# Patient Record
Sex: FEMALE | Race: WHITE | NOT HISPANIC OR LATINO | Employment: OTHER | URBAN - METROPOLITAN AREA
[De-identification: names, ages, dates, MRNs, and addresses within clinical notes are randomized per-mention and may not be internally consistent; named-entity substitution may affect disease eponyms.]

---

## 2017-01-05 RX ORDER — VALSARTAN AND HYDROCHLOROTHIAZIDE 160; 25 MG/1; MG/1
TABLET ORAL
Qty: 30 TABLET | Refills: 2 | Status: SHIPPED | OUTPATIENT
Start: 2017-01-05 | End: 2017-01-05 | Stop reason: SDUPTHER

## 2017-01-05 RX ORDER — VALSARTAN AND HYDROCHLOROTHIAZIDE 160; 25 MG/1; MG/1
1 TABLET ORAL DAILY
Qty: 30 TABLET | Refills: 2 | Status: SHIPPED | OUTPATIENT
Start: 2017-01-05 | End: 2017-03-07 | Stop reason: SDUPTHER

## 2017-01-05 NOTE — TELEPHONE ENCOUNTER
We can send patient in a 3 months supply of blood pressure medication. She does need to get in for an appointment in the next three months for a blood pressure check.

## 2017-02-03 ENCOUNTER — TELEPHONE (OUTPATIENT)
Dept: FAMILY MEDICINE CLINIC | Facility: CLINIC | Age: 62
End: 2017-02-03

## 2017-02-03 NOTE — TELEPHONE ENCOUNTER
We received a refill request for Cantril Thyroid. Patient needs to come in for an appointment. We can send her in 30 day supply if needed.

## 2017-02-10 ENCOUNTER — TELEPHONE (OUTPATIENT)
Dept: FAMILY MEDICINE CLINIC | Facility: CLINIC | Age: 62
End: 2017-02-10

## 2017-02-10 RX ORDER — LEVOTHYROXINE AND LIOTHYRONINE 57; 13.5 UG/1; UG/1
90 TABLET ORAL DAILY
Qty: 30 TABLET | Refills: 0 | Status: SHIPPED | OUTPATIENT
Start: 2017-02-10 | End: 2017-03-07 | Stop reason: SDUPTHER

## 2017-03-07 ENCOUNTER — OFFICE VISIT (OUTPATIENT)
Dept: FAMILY MEDICINE CLINIC | Facility: CLINIC | Age: 62
End: 2017-03-07

## 2017-03-07 VITALS
DIASTOLIC BLOOD PRESSURE: 78 MMHG | HEIGHT: 64 IN | HEART RATE: 78 BPM | OXYGEN SATURATION: 98 % | SYSTOLIC BLOOD PRESSURE: 128 MMHG | BODY MASS INDEX: 37.46 KG/M2 | WEIGHT: 219.4 LBS

## 2017-03-07 DIAGNOSIS — E03.9 ACQUIRED HYPOTHYROIDISM: ICD-10-CM

## 2017-03-07 DIAGNOSIS — Z00.00 PHYSICAL EXAM: Primary | ICD-10-CM

## 2017-03-07 DIAGNOSIS — I10 ESSENTIAL HYPERTENSION: ICD-10-CM

## 2017-03-07 PROCEDURE — 99396 PREV VISIT EST AGE 40-64: CPT | Performed by: NURSE PRACTITIONER

## 2017-03-07 RX ORDER — VALSARTAN AND HYDROCHLOROTHIAZIDE 160; 25 MG/1; MG/1
1 TABLET ORAL DAILY
Qty: 30 TABLET | Refills: 6 | Status: SHIPPED | OUTPATIENT
Start: 2017-03-07 | End: 2018-05-23 | Stop reason: SDUPTHER

## 2017-03-07 RX ORDER — TIMOLOL MALEATE 5 MG/ML
SOLUTION/ DROPS OPHTHALMIC
COMMUNITY
Start: 2017-02-28 | End: 2018-11-28

## 2017-03-07 RX ORDER — LEVOTHYROXINE AND LIOTHYRONINE 57; 13.5 UG/1; UG/1
90 TABLET ORAL DAILY
Qty: 30 TABLET | Refills: 6 | Status: SHIPPED | OUTPATIENT
Start: 2017-03-07 | End: 2017-10-13 | Stop reason: SDUPTHER

## 2017-03-07 NOTE — PROGRESS NOTES
"Subjective   Amairani Little is a 62 y.o. female.   Here to have blood work and to have her medications refilled.  C/O tinnitus and went to audiologist and does better when she watches her sugar.      Chief Complaint   Patient presents with   • Hypothyroidism     States she need blood work to get her thyroid medication.      Social History   Substance Use Topics   • Smoking status: Former Smoker   • Smokeless tobacco: Never Used   • Alcohol use None       History of Present Illness   Review of Systems   All other systems reviewed and are negative.      Objective   Vitals:    03/07/17 0812   BP: 128/78   Pulse: 78   SpO2: 98%   Weight: 219 lb 6.4 oz (99.5 kg)   Height: 63.5\" (161.3 cm)     Body mass index is 38.26 kg/(m^2).    Physical Exam   Constitutional: She appears well-developed and well-nourished. No distress.   HENT:   Head: Normocephalic.   Eyes: EOM are normal. Pupils are equal, round, and reactive to light.   Neck: Neck supple.   Cardiovascular: Normal rate and regular rhythm.    Pulmonary/Chest: Effort normal and breath sounds normal.   Abdominal: Soft.   Musculoskeletal: Normal range of motion.   Neurological: She is alert.   Skin: Skin is warm.   Nursing note and vitals reviewed.      Assessment/Plan   Problem List Items Addressed This Visit        Cardiovascular and Mediastinum    Essential hypertension    Relevant Medications    valsartan-hydrochlorothiazide (DIOVAN-HCT) 160-25 MG per tablet    Other Relevant Orders    Comprehensive Metabolic Panel      Other Visit Diagnoses     Physical exam    -  Primary    Relevant Orders    CBC Auto Differential    Lipid Panel With / Chol / HDL Ratio    Mammo Screening Bilateral With CAD    Acquired hypothyroidism        Relevant Medications    Thyroid (ARMOUR THYROID) 90 MG PO tablet    Other Relevant Orders    Hemoglobin A1c         Will call with lab results  "

## 2017-03-08 DIAGNOSIS — R89.9 ABNORMAL LABORATORY TEST RESULT: Primary | ICD-10-CM

## 2017-03-08 LAB
ALBUMIN SERPL-MCNC: 4.9 G/DL (ref 3.5–5.2)
ALBUMIN/GLOB SERPL: 1.9 G/DL
ALP SERPL-CCNC: 61 U/L (ref 39–117)
ALT SERPL-CCNC: 28 U/L (ref 1–33)
AST SERPL-CCNC: 27 U/L (ref 1–32)
BASOPHILS # BLD AUTO: 0.03 10*3/MM3 (ref 0–0.2)
BASOPHILS NFR BLD AUTO: 0.5 % (ref 0–1.5)
BILIRUB SERPL-MCNC: 0.8 MG/DL (ref 0.1–1.2)
BUN SERPL-MCNC: 15 MG/DL (ref 8–23)
BUN/CREAT SERPL: 16.5 (ref 7–25)
CALCIUM SERPL-MCNC: 10.1 MG/DL (ref 8.6–10.5)
CHLORIDE SERPL-SCNC: 99 MMOL/L (ref 98–107)
CHOLEST SERPL-MCNC: 274 MG/DL (ref 0–200)
CHOLEST/HDLC SERPL: 3.75 {RATIO}
CO2 SERPL-SCNC: 28.4 MMOL/L (ref 22–29)
CREAT SERPL-MCNC: 0.91 MG/DL (ref 0.57–1)
EOSINOPHIL # BLD AUTO: 0.33 10*3/MM3 (ref 0–0.7)
EOSINOPHIL NFR BLD AUTO: 5.4 % (ref 0.3–6.2)
ERYTHROCYTE [DISTWIDTH] IN BLOOD BY AUTOMATED COUNT: 13.9 % (ref 11.7–13)
GLOBULIN SER CALC-MCNC: 2.6 GM/DL
GLUCOSE SERPL-MCNC: 120 MG/DL (ref 65–99)
HBA1C MFR BLD: 5.4 % (ref 4.8–5.6)
HCT VFR BLD AUTO: 44.2 % (ref 35.6–45.5)
HDLC SERPL-MCNC: 73 MG/DL (ref 40–60)
HGB BLD-MCNC: 14.8 G/DL (ref 11.9–15.5)
IMM GRANULOCYTES # BLD: 0 10*3/MM3 (ref 0–0.03)
IMM GRANULOCYTES NFR BLD: 0 % (ref 0–0.5)
LDLC SERPL CALC-MCNC: 174 MG/DL (ref 0–100)
LYMPHOCYTES # BLD AUTO: 2.4 10*3/MM3 (ref 0.9–4.8)
LYMPHOCYTES NFR BLD AUTO: 39.2 % (ref 19.6–45.3)
MCH RBC QN AUTO: 30.5 PG (ref 26.9–32)
MCHC RBC AUTO-ENTMCNC: 33.5 G/DL (ref 32.4–36.3)
MCV RBC AUTO: 90.9 FL (ref 80.5–98.2)
MONOCYTES # BLD AUTO: 0.5 10*3/MM3 (ref 0.2–1.2)
MONOCYTES NFR BLD AUTO: 8.2 % (ref 5–12)
NEUTROPHILS # BLD AUTO: 2.86 10*3/MM3 (ref 1.9–8.1)
NEUTROPHILS NFR BLD AUTO: 46.7 % (ref 42.7–76)
PLATELET # BLD AUTO: 202 10*3/MM3 (ref 140–500)
POTASSIUM SERPL-SCNC: 3.7 MMOL/L (ref 3.5–5.2)
PROT SERPL-MCNC: 7.5 G/DL (ref 6–8.5)
RBC # BLD AUTO: 4.86 10*6/MM3 (ref 3.9–5.2)
SODIUM SERPL-SCNC: 143 MMOL/L (ref 136–145)
TRIGL SERPL-MCNC: 135 MG/DL (ref 0–150)
VLDLC SERPL CALC-MCNC: 27 MG/DL (ref 5–40)
WBC # BLD AUTO: 6.12 10*3/MM3 (ref 4.5–10.7)

## 2017-03-09 ENCOUNTER — TELEPHONE (OUTPATIENT)
Dept: FAMILY MEDICINE CLINIC | Facility: CLINIC | Age: 62
End: 2017-03-09

## 2017-03-09 DIAGNOSIS — Z13.29 SCREENING FOR HYPOTHYROIDISM: Primary | ICD-10-CM

## 2017-03-09 LAB
Lab: NORMAL
SPECIMEN STATUS: NORMAL
TSH SERPL DL<=0.005 MIU/L-ACNC: 3.35 MIU/ML (ref 0.27–4.2)

## 2017-03-09 NOTE — TELEPHONE ENCOUNTER
----- Message from Kindra Lan sent at 3/8/2017  4:28 PM EST -----  Patient called and I read her the labs results, pt is asking if we done a thryoid and if not can one be added to yesterday labs. Spoke with Landy in [facility] and she said that it could be added. Added and pended order to Kindra Biggs.

## 2017-03-09 NOTE — TELEPHONE ENCOUNTER
----- Message from Nancy Reynoso MA sent at 3/9/2017 11:07 AM EST -----  Regarding: FW: Non-Urgent Medical Question  Contact: [phone]      ----- Message -----     From: Mey Briceno MA     Sent: 3/9/2017  10:48 AM       To: Nancy Reynoso MA  Subject: FW: Non-Urgent Medical Question                      ----- Message -----     From: Amairani Little     Sent: 3/9/2017   9:45 AM       To: Angelika [facility]  Subject: Non-Urgent Medical Question                      The sole reason for my visit Tuesday morning was to get my thyroid tested so that I can get my prescription refilled.  However, no results showing tsh testing was ever done.  Can you use the blood that was drawn for this?    Amairani Little

## 2017-03-16 ENCOUNTER — TELEPHONE (OUTPATIENT)
Dept: FAMILY MEDICINE CLINIC | Facility: CLINIC | Age: 62
End: 2017-03-16

## 2017-03-16 NOTE — TELEPHONE ENCOUNTER
Pt called and left a VM wanting TSH result. Called pt back and gave her results and made her aware that I had sent her a letter with the results as well.

## 2017-04-08 ENCOUNTER — RESULTS ENCOUNTER (OUTPATIENT)
Dept: FAMILY MEDICINE CLINIC | Facility: CLINIC | Age: 62
End: 2017-04-08

## 2017-04-08 DIAGNOSIS — Z13.29 SCREENING FOR HYPOTHYROIDISM: ICD-10-CM

## 2017-07-18 ENCOUNTER — RESULTS ENCOUNTER (OUTPATIENT)
Dept: FAMILY MEDICINE CLINIC | Facility: CLINIC | Age: 62
End: 2017-07-18

## 2017-07-18 DIAGNOSIS — R89.9 ABNORMAL LABORATORY TEST RESULT: ICD-10-CM

## 2017-10-13 RX ORDER — THYROID,PORK 90 MG
TABLET ORAL
Qty: 30 TABLET | Refills: 5 | Status: SHIPPED | OUTPATIENT
Start: 2017-10-13 | End: 2018-05-09

## 2017-11-20 ENCOUNTER — TELEPHONE (OUTPATIENT)
Dept: FAMILY MEDICINE CLINIC | Facility: CLINIC | Age: 62
End: 2017-11-20

## 2017-11-20 NOTE — TELEPHONE ENCOUNTER
Please contact this patient for fasting labs. Her cholesterol was elevated in March, Kindra would like to recheck this number. Let me know what she decides.

## 2018-01-15 RX ORDER — VALSARTAN AND HYDROCHLOROTHIAZIDE 160; 25 MG/1; MG/1
TABLET ORAL
Qty: 30 TABLET | Refills: 1 | OUTPATIENT
Start: 2018-01-15

## 2018-01-25 RX ORDER — VALSARTAN AND HYDROCHLOROTHIAZIDE 160; 25 MG/1; MG/1
TABLET ORAL
Qty: 30 TABLET | Refills: 0 | Status: SHIPPED | OUTPATIENT
Start: 2018-01-25 | End: 2018-02-25 | Stop reason: SDUPTHER

## 2018-02-26 RX ORDER — VALSARTAN AND HYDROCHLOROTHIAZIDE 160; 25 MG/1; MG/1
TABLET ORAL
Qty: 30 TABLET | Refills: 0 | Status: SHIPPED | OUTPATIENT
Start: 2018-02-26 | End: 2018-05-09

## 2018-03-02 RX ORDER — VALSARTAN AND HYDROCHLOROTHIAZIDE 160; 25 MG/1; MG/1
TABLET ORAL
Qty: 30 TABLET | Refills: 0 | Status: SHIPPED | OUTPATIENT
Start: 2018-03-02 | End: 2018-04-30 | Stop reason: SDUPTHER

## 2018-04-30 RX ORDER — VALSARTAN AND HYDROCHLOROTHIAZIDE 160; 25 MG/1; MG/1
TABLET ORAL
Qty: 15 TABLET | Refills: 0 | Status: SHIPPED | OUTPATIENT
Start: 2018-04-30 | End: 2018-05-09

## 2018-04-30 RX ORDER — VALSARTAN AND HYDROCHLOROTHIAZIDE 160; 25 MG/1; MG/1
TABLET ORAL
Qty: 15 TABLET | Refills: 0 | OUTPATIENT
Start: 2018-04-30

## 2018-04-30 NOTE — TELEPHONE ENCOUNTER
Please, call pt and schedule an apt for her Physical wellness or for her BP check, whatever is more convenient for pt. Giving her 15 days supply for her Valsartan-HCTZ. Thanks

## 2018-05-09 ENCOUNTER — OFFICE VISIT (OUTPATIENT)
Dept: FAMILY MEDICINE CLINIC | Facility: CLINIC | Age: 63
End: 2018-05-09

## 2018-05-09 VITALS
OXYGEN SATURATION: 97 % | WEIGHT: 220 LBS | HEIGHT: 64 IN | BODY MASS INDEX: 37.56 KG/M2 | SYSTOLIC BLOOD PRESSURE: 104 MMHG | HEART RATE: 65 BPM | DIASTOLIC BLOOD PRESSURE: 70 MMHG

## 2018-05-09 DIAGNOSIS — R07.9 CHEST PAIN, UNSPECIFIED TYPE: Primary | ICD-10-CM

## 2018-05-09 DIAGNOSIS — E03.8 OTHER SPECIFIED HYPOTHYROIDISM: ICD-10-CM

## 2018-05-09 PROCEDURE — 93000 ELECTROCARDIOGRAM COMPLETE: CPT | Performed by: NURSE PRACTITIONER

## 2018-05-09 PROCEDURE — 99213 OFFICE O/P EST LOW 20 MIN: CPT | Performed by: NURSE PRACTITIONER

## 2018-05-09 NOTE — PROGRESS NOTES
"Amairani Little is a 63 y.o. female.Patient presents for a blood pressure check.     Patient states that she did stop taking Amour thyroid due to side effects, states she feels fine.    Has had some chest pain for over a year, comes and goes, describes it as a pressure type pain, no shortness of breath.      Assessment/Plan   Problem List Items Addressed This Visit        Endocrine    Other specified hypothyroidism    Relevant Orders    Ambulatory Referral to ENT (Otolaryngology)      Other Visit Diagnoses     Chest pain, unspecified type    -  Primary    Relevant Orders    ECG 12 Lead             No Follow-up on file.  There are no Patient Instructions on file for this visit.    Chief Complaint   Patient presents with   • Hypertension     Social History   Substance Use Topics   • Smoking status: Former Smoker   • Smokeless tobacco: Never Used   • Alcohol use Not on file       History of Present Illness     The following portions of the patient's history were reviewed and updated as appropriate:PMHroutine: Social history , Allergies, Current Medications and Active Problem List    Review of Systems   Constitutional: Negative for activity change, appetite change and fatigue.   Respiratory: Positive for chest tightness. Negative for cough and shortness of breath.    Cardiovascular: Negative for chest pain, palpitations and leg swelling.   Endocrine: Negative for cold intolerance and heat intolerance.   Neurological: Negative for dizziness.       Objective   Vitals:    05/09/18 1409   BP: 104/70   Pulse: 65   SpO2: 97%   Weight: 99.8 kg (220 lb)   Height: 161.3 cm (63.5\")     Body mass index is 38.36 kg/m².  Physical Exam   Constitutional: She appears well-developed and well-nourished. No distress.   HENT:   Head: Normocephalic and atraumatic.   Right Ear: External ear normal.   Left Ear: External ear normal.   Eyes: EOM are normal.   Neck: Neck supple. No thyromegaly present.   Cardiovascular: Normal rate, regular rhythm " and normal heart sounds.    Pulmonary/Chest: Effort normal and breath sounds normal.   Musculoskeletal: Normal range of motion.   Neurological: She is alert.   Skin: Skin is warm.   Nursing note and vitals reviewed.    Reviewed Data:  No visits with results within 1 Month(s) from this visit.   Latest known visit with results is:   Office Visit on 03/07/2017   Component Date Value Ref Range Status   • Glucose 03/08/2017 120* 65 - 99 mg/dL Final   • BUN 03/08/2017 15  8 - 23 mg/dL Final   • Creatinine 03/08/2017 0.91  0.57 - 1.00 mg/dL Final   • eGFR Non  Am 03/08/2017 62  >60 mL/min/1.73 Final   • eGFR African Am 03/08/2017 75  >60 mL/min/1.73 Final   • BUN/Creatinine Ratio 03/08/2017 16.5  7.0 - 25.0 Final   • Sodium 03/08/2017 143  136 - 145 mmol/L Final   • Potassium 03/08/2017 3.7  3.5 - 5.2 mmol/L Final   • Chloride 03/08/2017 99  98 - 107 mmol/L Final   • Total CO2 03/08/2017 28.4  22.0 - 29.0 mmol/L Final   • Calcium 03/08/2017 10.1  8.6 - 10.5 mg/dL Final   • Total Protein 03/08/2017 7.5  6.0 - 8.5 g/dL Final   • Albumin 03/08/2017 4.90  3.50 - 5.20 g/dL Final   • Globulin 03/08/2017 2.6  gm/dL Final   • A/G Ratio 03/08/2017 1.9  g/dL Final   • Total Bilirubin 03/08/2017 0.8  0.1 - 1.2 mg/dL Final   • Alkaline Phosphatase 03/08/2017 61  39 - 117 U/L Final   • AST (SGOT) 03/08/2017 27  1 - 32 U/L Final   • ALT (SGPT) 03/08/2017 28  1 - 33 U/L Final   • Total Cholesterol 03/08/2017 274* 0 - 200 mg/dL Final   • Triglycerides 03/08/2017 135  0 - 150 mg/dL Final   • HDL Cholesterol 03/08/2017 73* 40 - 60 mg/dL Final   • VLDL Cholesterol 03/08/2017 27  5 - 40 mg/dL Final   • LDL Cholesterol  03/08/2017 174* 0 - 100 mg/dL Final   • Chol/HDL Ratio 03/08/2017 3.75   Final   • Hemoglobin A1C 03/08/2017 5.40  4.80 - 5.60 % Final    Comment: Hemoglobin A1C Ranges:  Increased Risk for Diabetes  5.7% to 6.4%  Diabetes                     >= 6.5%  Diabetic Goal                < 7.0%     • WBC 03/08/2017 6.12  4.50 -  10.70 10*3/mm3 Final   • RBC 03/08/2017 4.86  3.90 - 5.20 10*6/mm3 Final   • Hemoglobin 03/08/2017 14.8  11.9 - 15.5 g/dL Final   • Hematocrit 03/08/2017 44.2  35.6 - 45.5 % Final   • MCV 03/08/2017 90.9  80.5 - 98.2 fL Final   • MCH 03/08/2017 30.5  26.9 - 32.0 pg Final   • MCHC 03/08/2017 33.5  32.4 - 36.3 g/dL Final   • RDW 03/08/2017 13.9* 11.7 - 13.0 % Final   • Platelets 03/08/2017 202  140 - 500 10*3/mm3 Final   • Neutrophil Rel % 03/08/2017 46.7  42.7 - 76.0 % Final   • Lymphocyte Rel % 03/08/2017 39.2  19.6 - 45.3 % Final   • Monocyte Rel % 03/08/2017 8.2  5.0 - 12.0 % Final   • Eosinophil Rel % 03/08/2017 5.4  0.3 - 6.2 % Final   • Basophil Rel % 03/08/2017 0.5  0.0 - 1.5 % Final   • Neutrophils Absolute 03/08/2017 2.86  1.90 - 8.10 10*3/mm3 Final   • Lymphocytes Absolute 03/08/2017 2.40  0.90 - 4.80 10*3/mm3 Final   • Monocytes Absolute 03/08/2017 0.50  0.20 - 1.20 10*3/mm3 Final   • Eosinophils Absolute 03/08/2017 0.33  0.00 - 0.70 10*3/mm3 Final   • Basophils Absolute 03/08/2017 0.03  0.00 - 0.20 10*3/mm3 Final   • Immature Granulocyte Rel % 03/08/2017 0.0  0.0 - 0.5 % Final   • Immature Grans Absolute 03/08/2017 0.00  0.00 - 0.03 10*3/mm3 Final   • Please note 03/09/2017 Comment   Final    Comment: We have received your request for additional testing or test  verification.  You will be notified if we are unable to process  your request.     • Specimen Status 03/09/2017 Comment   Final    Comment: Written Authorization  Written Authorization  Written Authorization Received.  Authorization received from WRITTEN REQUEST 03-  Logged by Angela Quinn     • TSH 03/09/2017 3.350  0.270 - 4.200 mIU/mL Final       ECG 12 Lead  Date/Time: 5/10/2018 1:38 PM  Performed by: SUMAN BEE  Authorized by: SUMAN BEE   Comparison: not compared with previous ECG   Rhythm: sinus rhythm  Rate: normal  ST Segments: ST segments normal  QRS axis: normal  Comments: Normal ECG will return and follow up with  Dr. Weinberg if the pain continues

## 2018-05-15 RX ORDER — VALSARTAN AND HYDROCHLOROTHIAZIDE 160; 25 MG/1; MG/1
TABLET ORAL
Qty: 15 TABLET | Refills: 0 | OUTPATIENT
Start: 2018-05-15

## 2018-05-23 RX ORDER — VALSARTAN AND HYDROCHLOROTHIAZIDE 160; 25 MG/1; MG/1
TABLET ORAL
Qty: 30 TABLET | Refills: 1 | Status: SHIPPED | OUTPATIENT
Start: 2018-05-23 | End: 2018-07-25 | Stop reason: ALTCHOICE

## 2018-06-05 ENCOUNTER — TELEPHONE (OUTPATIENT)
Dept: FAMILY MEDICINE CLINIC | Facility: CLINIC | Age: 63
End: 2018-06-05

## 2018-06-14 ENCOUNTER — TELEPHONE (OUTPATIENT)
Dept: FAMILY MEDICINE CLINIC | Facility: CLINIC | Age: 63
End: 2018-06-14

## 2018-07-25 DIAGNOSIS — I10 ESSENTIAL HYPERTENSION: Primary | ICD-10-CM

## 2018-07-25 RX ORDER — VALSARTAN AND HYDROCHLOROTHIAZIDE 160; 25 MG/1; MG/1
TABLET ORAL
Qty: 30 TABLET | Refills: 0 | OUTPATIENT
Start: 2018-07-25

## 2018-07-25 RX ORDER — IRBESARTAN AND HYDROCHLOROTHIAZIDE 150; 12.5 MG/1; MG/1
1 TABLET, FILM COATED ORAL DAILY
Qty: 30 TABLET | Refills: 1 | Status: SHIPPED | OUTPATIENT
Start: 2018-07-25 | End: 2018-10-09 | Stop reason: SDUPTHER

## 2018-07-25 NOTE — TELEPHONE ENCOUNTER
Sidra this Valsartan has been taken off the market. Please make a change and send back to me so that I can inform the patient of the change. I will ask her to keep a log for the next few weeks and send us these results. Last OV ace/ Kindra Biggs on 5/9/18.

## 2018-07-25 NOTE — TELEPHONE ENCOUNTER
Patient made aware and voiced understanding. She will log her BP and send a [employer] message with results.    Patient states that she did see endocrinology for TSH levels, she was placed on levothyroxine and had a bad reaction. She stopped taking the medication and has decided to handle this with supplements.

## 2018-09-17 DIAGNOSIS — Z12.39 SCREENING FOR BREAST CANCER: Primary | ICD-10-CM

## 2018-10-09 DIAGNOSIS — I10 ESSENTIAL HYPERTENSION: ICD-10-CM

## 2018-10-09 RX ORDER — IRBESARTAN AND HYDROCHLOROTHIAZIDE 150; 12.5 MG/1; MG/1
TABLET, FILM COATED ORAL
Qty: 30 TABLET | Refills: 0 | Status: SHIPPED | OUTPATIENT
Start: 2018-10-09 | End: 2018-11-14 | Stop reason: SDUPTHER

## 2018-11-14 DIAGNOSIS — I10 ESSENTIAL HYPERTENSION: ICD-10-CM

## 2018-11-15 RX ORDER — IRBESARTAN AND HYDROCHLOROTHIAZIDE 150; 12.5 MG/1; MG/1
TABLET, FILM COATED ORAL
Qty: 30 TABLET | Refills: 0 | Status: SHIPPED | OUTPATIENT
Start: 2018-11-15 | End: 2018-12-15 | Stop reason: SDUPTHER

## 2018-11-28 ENCOUNTER — OFFICE VISIT (OUTPATIENT)
Dept: FAMILY MEDICINE CLINIC | Facility: CLINIC | Age: 63
End: 2018-11-28

## 2018-11-28 VITALS
HEART RATE: 72 BPM | SYSTOLIC BLOOD PRESSURE: 142 MMHG | HEIGHT: 64 IN | WEIGHT: 223 LBS | BODY MASS INDEX: 38.07 KG/M2 | DIASTOLIC BLOOD PRESSURE: 78 MMHG | OXYGEN SATURATION: 97 %

## 2018-11-28 DIAGNOSIS — R07.89 CHEST PRESSURE: Primary | ICD-10-CM

## 2018-11-28 PROCEDURE — 99213 OFFICE O/P EST LOW 20 MIN: CPT | Performed by: NURSE PRACTITIONER

## 2018-11-28 PROCEDURE — 93000 ELECTROCARDIOGRAM COMPLETE: CPT | Performed by: NURSE PRACTITIONER

## 2018-11-28 RX ORDER — TIMOLOL MALEATE 5 MG/ML
1 SOLUTION/ DROPS OPHTHALMIC 2 TIMES DAILY
COMMUNITY

## 2018-11-28 NOTE — PROGRESS NOTES
"Amairani Little is a 63 y.o. female.Amairani states that she was seen by a thyroid specialist, she was placed on medication, but did not like it. She continues to have intermittent chest pain/pressure with exertion, has been for about 8 months.       Assessment/Plan   Problem List Items Addressed This Visit     None      Visit Diagnoses     Chest pressure    -  Primary    Relevant Orders    ECG 12 Lead    Ambulatory Referral to Cardiology             No Follow-up on file.  There are no Patient Instructions on file for this visit.    Chief Complaint   Patient presents with   • Hypertension     Social History     Tobacco Use   • Smoking status: Former Smoker   • Smokeless tobacco: Never Used   Substance Use Topics   • Alcohol use: Not on file   • Drug use: No       History of Present Illness     The following portions of the patient's history were reviewed and updated as appropriate:PMHroutine: Social history , Allergies, Current Medications, Active Problem List, Family History and Health Maintenance    Review of Systems   Constitutional: Negative for activity change and appetite change.   Respiratory: Negative for cough.    Cardiovascular: Positive for chest pain. Negative for palpitations and leg swelling.   Gastrointestinal: Negative for nausea and vomiting.   Neurological: Negative for dizziness and headaches.       Objective   Vitals:    11/28/18 1311   BP: 142/78   Pulse: 72   SpO2: 97%   Weight: 101 kg (223 lb)   Height: 161.3 cm (63.5\")     Body mass index is 38.88 kg/m².  Physical Exam   Constitutional: She appears well-developed and well-nourished. No distress.   HENT:   Head: Normocephalic and atraumatic.   Eyes: EOM are normal.   Neck: Neck supple. No thyromegaly present.   Cardiovascular: Normal rate, regular rhythm and normal heart sounds.   Pulmonary/Chest: Effort normal and breath sounds normal.   Musculoskeletal: Normal range of motion.   Neurological: She is alert.   Skin: Skin is warm.   Nursing note " and vitals reviewed.      Reviewed Data:  No visits with results within 1 Month(s) from this visit.   Latest known visit with results is:   Office Visit on 03/07/2017   Component Date Value Ref Range Status   • Glucose 03/07/2017 120* 65 - 99 mg/dL Final   • BUN 03/07/2017 15  8 - 23 mg/dL Final   • Creatinine 03/07/2017 0.91  0.57 - 1.00 mg/dL Final   • eGFR Non  Am 03/07/2017 62  >60 mL/min/1.73 Final   • eGFR African Am 03/07/2017 75  >60 mL/min/1.73 Final   • BUN/Creatinine Ratio 03/07/2017 16.5  7.0 - 25.0 Final   • Sodium 03/07/2017 143  136 - 145 mmol/L Final   • Potassium 03/07/2017 3.7  3.5 - 5.2 mmol/L Final   • Chloride 03/07/2017 99  98 - 107 mmol/L Final   • Total CO2 03/07/2017 28.4  22.0 - 29.0 mmol/L Final   • Calcium 03/07/2017 10.1  8.6 - 10.5 mg/dL Final   • Total Protein 03/07/2017 7.5  6.0 - 8.5 g/dL Final   • Albumin 03/07/2017 4.90  3.50 - 5.20 g/dL Final   • Globulin 03/07/2017 2.6  gm/dL Final   • A/G Ratio 03/07/2017 1.9  g/dL Final   • Total Bilirubin 03/07/2017 0.8  0.1 - 1.2 mg/dL Final   • Alkaline Phosphatase 03/07/2017 61  39 - 117 U/L Final   • AST (SGOT) 03/07/2017 27  1 - 32 U/L Final   • ALT (SGPT) 03/07/2017 28  1 - 33 U/L Final   • Total Cholesterol 03/07/2017 274* 0 - 200 mg/dL Final   • Triglycerides 03/07/2017 135  0 - 150 mg/dL Final   • HDL Cholesterol 03/07/2017 73* 40 - 60 mg/dL Final   • VLDL Cholesterol 03/07/2017 27  5 - 40 mg/dL Final   • LDL Cholesterol  03/07/2017 174* 0 - 100 mg/dL Final   • Chol/HDL Ratio 03/07/2017 3.75   Final   • Hemoglobin A1C 03/07/2017 5.40  4.80 - 5.60 % Final    Comment: Hemoglobin A1C Ranges:  Increased Risk for Diabetes  5.7% to 6.4%  Diabetes                     >= 6.5%  Diabetic Goal                < 7.0%     • WBC 03/07/2017 6.12  4.50 - 10.70 10*3/mm3 Final   • RBC 03/07/2017 4.86  3.90 - 5.20 10*6/mm3 Final   • Hemoglobin 03/07/2017 14.8  11.9 - 15.5 g/dL Final   • Hematocrit 03/07/2017 44.2  35.6 - 45.5 % Final   • MCV  03/07/2017 90.9  80.5 - 98.2 fL Final   • MCH 03/07/2017 30.5  26.9 - 32.0 pg Final   • MCHC 03/07/2017 33.5  32.4 - 36.3 g/dL Final   • RDW 03/07/2017 13.9* 11.7 - 13.0 % Final   • Platelets 03/07/2017 202  140 - 500 10*3/mm3 Final   • Neutrophil Rel % 03/07/2017 46.7  42.7 - 76.0 % Final   • Lymphocyte Rel % 03/07/2017 39.2  19.6 - 45.3 % Final   • Monocyte Rel % 03/07/2017 8.2  5.0 - 12.0 % Final   • Eosinophil Rel % 03/07/2017 5.4  0.3 - 6.2 % Final   • Basophil Rel % 03/07/2017 0.5  0.0 - 1.5 % Final   • Neutrophils Absolute 03/07/2017 2.86  1.90 - 8.10 10*3/mm3 Final   • Lymphocytes Absolute 03/07/2017 2.40  0.90 - 4.80 10*3/mm3 Final   • Monocytes Absolute 03/07/2017 0.50  0.20 - 1.20 10*3/mm3 Final   • Eosinophils Absolute 03/07/2017 0.33  0.00 - 0.70 10*3/mm3 Final   • Basophils Absolute 03/07/2017 0.03  0.00 - 0.20 10*3/mm3 Final   • Immature Granulocyte Rel % 03/07/2017 0.0  0.0 - 0.5 % Final   • Immature Grans Absolute 03/07/2017 0.00  0.00 - 0.03 10*3/mm3 Final   • Please note 03/07/2017 Comment   Final    Comment: We have received your request for additional testing or test  verification.  You will be notified if we are unable to process  your request.     • Specimen Status 03/07/2017 Comment   Final    Comment: Written Authorization  Written Authorization  Written Authorization Received.  Authorization received from WRITTEN REQUEST 03-  Logged by Angela Quinn     • TSH 03/07/2017 3.350  0.270 - 4.200 mIU/mL Final       ECG 12 Lead  Date/Time: 11/29/2018 9:02 AM  Performed by: Kindra Biggs APRN  Authorized by: Warren, Kindra M, APRN   Comparison: not compared with previous ECG   Rhythm: sinus rhythm  Rate: normal  ST Segments: ST segments normal  T Waves: T waves normal  QRS axis: normal  Clinical impression: normal ECG  Comments: Pt would like to be seen by a cardiologist and since this has been going on for so long a referral was placed

## 2018-12-15 DIAGNOSIS — I10 ESSENTIAL HYPERTENSION: ICD-10-CM

## 2018-12-17 RX ORDER — IRBESARTAN AND HYDROCHLOROTHIAZIDE 150; 12.5 MG/1; MG/1
TABLET, FILM COATED ORAL
Qty: 30 TABLET | Refills: 0 | Status: SHIPPED | OUTPATIENT
Start: 2018-12-17 | End: 2019-01-15 | Stop reason: SDUPTHER

## 2018-12-20 ENCOUNTER — OFFICE VISIT (OUTPATIENT)
Dept: CARDIOLOGY | Facility: CLINIC | Age: 63
End: 2018-12-20

## 2018-12-20 VITALS
SYSTOLIC BLOOD PRESSURE: 124 MMHG | DIASTOLIC BLOOD PRESSURE: 88 MMHG | HEART RATE: 66 BPM | HEIGHT: 64 IN | WEIGHT: 222 LBS | BODY MASS INDEX: 37.9 KG/M2

## 2018-12-20 DIAGNOSIS — E66.9 OBESITY (BMI 35.0-39.9 WITHOUT COMORBIDITY): ICD-10-CM

## 2018-12-20 DIAGNOSIS — R07.2 PRECORDIAL PAIN: Primary | ICD-10-CM

## 2018-12-20 DIAGNOSIS — I10 ESSENTIAL HYPERTENSION: ICD-10-CM

## 2018-12-20 PROCEDURE — 93000 ELECTROCARDIOGRAM COMPLETE: CPT | Performed by: INTERNAL MEDICINE

## 2018-12-20 PROCEDURE — 99204 OFFICE O/P NEW MOD 45 MIN: CPT | Performed by: INTERNAL MEDICINE

## 2018-12-20 NOTE — PROGRESS NOTES
Date of Office Visit: 2018  Encounter Provider: Mateo Asif MD  Place of Service: [facility] CARDIOLOGY  Patient Name: Amairani Little  :1955  Kindra Biggs APRN    Chief Complaint   Patient presents with   • Chest Pain     History of Present Illness  I appreciate the opportunity to evaluate this patient in consultation for chest pain.    The patient noted the onset of substernal chest discomfort in 2017.  She describes a heaviness across her precordium that radiates up into the neck.  The episodes are precipitated by exertion or stressful situations.  They last for minutes up to one hour.  They are always relieved by rest.  She has had some nocturnal episodes that have awakened her from sleep.  The intensity of the discomfort has not changed but the frequency has increased over the past year.  There is some radiation of the symptoms to her left and right arms.  There is no radiation up into the jaw.  There is no associated diaphoresis or palpitations.    The patient's risk factors for coronary disease include hypertension, hyperlipidemia and a family history.  The patient also has a remote smoking history but none since .  She also has been moderately obese most of her adult life.      Past Medical History:   Diagnosis Date   • Chest pressure    • Depression    • Essential hypertension    • Glaucoma    • Hyperlipidemia    • Hypothyroidism    • Interstitial cystitis          Past Surgical History:   Procedure Laterality Date   • CHOLECYSTECTOMY     • ENDOMETRIAL ABLATION     • EYE ENUCLEATION Left age 11   • TONSILLECTOMY             Current Outpatient Medications:   •  irbesartan-hydrochlorothiazide (AVALIDE) 150-12.5 MG tablet, TAKE ONE TABLET BY MOUTH DAILY, Disp: 30 tablet, Rfl: 0  •  timolol (TIMOPTIC) 0.5 % ophthalmic solution, 1 drop 2 (Two) Times a Day., Disp: , Rfl:       Social History     Socioeconomic History   • Marital status: Single      "Spouse name: Not on file   • Number of children: Not on file   • Years of education: Not on file   • Highest education level: Not on file   Social Needs   • Financial resource strain: Not on file   • Food insecurity - worry: Not on file   • Food insecurity - inability: Not on file   • Transportation needs - medical: Not on file   • Transportation needs - non-medical: Not on file   Occupational History   • Not on file   Tobacco Use   • Smoking status: Former Smoker   • Smokeless tobacco: Never Used   Substance and Sexual Activity   • Alcohol use: Not on file   • Drug use: No   • Sexual activity: Defer   Other Topics Concern   • Not on file   Social History Narrative   • Not on file         Review of Systems   Constitution: Positive for malaise/fatigue.   HENT: Negative.    Eyes: Negative.    Cardiovascular: Positive for chest pain, dyspnea on exertion and palpitations.   Respiratory: Negative.    Endocrine: Negative.    Skin: Negative.    Musculoskeletal: Negative.    Gastrointestinal: Negative.    Neurological: Negative.    Psychiatric/Behavioral: Negative.        Procedures      ECG 12 Lead  Date/Time: 12/20/2018 3:39 PM  Performed by: Mateo Asif MD  Authorized by: Mateo Asif MD   Comparison: compared with previous ECG from 11/28/2018  Similar to previous ECG  Rhythm: sinus rhythm  Rate: normal  Conduction: conduction normal  QRS axis: normal                  Objective:    /88   Pulse 66   Ht 161.3 cm (63.5\")   Wt 101 kg (222 lb)   BMI 38.71 kg/m²         Physical Exam   Constitutional: She is oriented to person, place, and time. She appears well-developed and well-nourished.   HENT:   Head: Normocephalic.   Eyes: Pupils are equal, round, and reactive to light.   Neck: Normal range of motion. No JVD present. Carotid bruit is not present. No thyromegaly present.   Cardiovascular: Normal rate, regular rhythm, S1 normal, S2 normal, normal heart sounds and intact distal pulses. Exam " reveals no gallop and no friction rub.   No murmur heard.  Pulmonary/Chest: Effort normal and breath sounds normal.   Abdominal: Soft. Bowel sounds are normal.   Musculoskeletal: She exhibits no edema.   Neurological: She is alert and oriented to person, place, and time.   Skin: Skin is warm, dry and intact. No erythema.   Psychiatric: She has a normal mood and affect.   Vitals reviewed.          Assessment:       Diagnosis Plan   1. Precordial pain  Treadmill Stress Test   2. Essential hypertension  Treadmill Stress Test   3. Obesity (BMI 35.0-39.9 without comorbidity)       1.  Chest pain: The patient's symptoms appeared to be that of angina pectoris.  Her electrocardiogram and exam however remain normal.  We will plan on a stress test.    2.  Hypertension: Controlled on medical therapy    3.  Obesity: Diet and exercise measures discussed       Plan:       Review stress test when complete.    I appreciate the opportunity to evaluate this patient in consultation

## 2018-12-28 ENCOUNTER — HOSPITAL ENCOUNTER (OUTPATIENT)
Dept: CARDIOLOGY | Facility: HOSPITAL | Age: 63
Discharge: HOME | End: 2018-12-28
Attending: INTERNAL MEDICINE | Admitting: INTERNAL MEDICINE

## 2018-12-28 DIAGNOSIS — I10 ESSENTIAL HYPERTENSION: ICD-10-CM

## 2018-12-28 DIAGNOSIS — R07.2 PRECORDIAL PAIN: ICD-10-CM

## 2018-12-28 LAB
BH CV STRESS BP STAGE 1: NORMAL
BH CV STRESS BP STAGE 2: NORMAL
BH CV STRESS DURATION MIN STAGE 1: 3
BH CV STRESS DURATION MIN STAGE 2: 3
BH CV STRESS DURATION SEC STAGE 1: 0
BH CV STRESS DURATION SEC STAGE 2: 0
BH CV STRESS DURATION SEC STAGE 3: 30
BH CV STRESS GRADE STAGE 1: 10
BH CV STRESS GRADE STAGE 2: 12
BH CV STRESS GRADE STAGE 3: 14
BH CV STRESS HR STAGE 1: 115
BH CV STRESS HR STAGE 2: 146
BH CV STRESS HR STAGE 3: 156
BH CV STRESS METS STAGE 1: 5
BH CV STRESS METS STAGE 2: 7.5
BH CV STRESS METS STAGE 3: 10
BH CV STRESS PROTOCOL 1: NORMAL
BH CV STRESS RECOVERY BP: NORMAL MMHG
BH CV STRESS RECOVERY HR: 103 BPM
BH CV STRESS SPEED STAGE 1: 1.7
BH CV STRESS SPEED STAGE 2: 2.5
BH CV STRESS SPEED STAGE 3: 3.4
BH CV STRESS STAGE 1: 1
BH CV STRESS STAGE 2: 2
BH CV STRESS STAGE 3: 3
MAXIMAL PREDICTED HEART RATE: 155 BPM
PERCENT MAX PREDICTED HR: 100.65 %
STRESS BASELINE BP: NORMAL MMHG
STRESS BASELINE HR: 77 BPM
STRESS PERCENT HR: 118 %
STRESS POST ESTIMATED WORKLOAD: 7 METS
STRESS POST EXERCISE DUR MIN: 6 MIN
STRESS POST EXERCISE DUR SEC: 30 SEC
STRESS POST PEAK BP: NORMAL MMHG
STRESS POST PEAK HR: 156 BPM
STRESS TARGET HR: 132 BPM

## 2018-12-28 PROCEDURE — 93018 CV STRESS TEST I&R ONLY: CPT | Performed by: INTERNAL MEDICINE

## 2018-12-28 PROCEDURE — 93016 CV STRESS TEST SUPVJ ONLY: CPT | Performed by: INTERNAL MEDICINE

## 2018-12-28 PROCEDURE — 93017 CV STRESS TEST TRACING ONLY: CPT

## 2018-12-31 DIAGNOSIS — I20.9 ANGINA PECTORIS (HCC): Primary | ICD-10-CM

## 2019-01-03 ENCOUNTER — TRANSCRIBE ORDERS (OUTPATIENT)
Dept: CARDIOLOGY | Facility: CLINIC | Age: 64
End: 2019-01-03

## 2019-01-03 DIAGNOSIS — Z13.6 SCREENING FOR CARDIOVASCULAR CONDITION: Primary | ICD-10-CM

## 2019-01-03 DIAGNOSIS — Z01.810 PREPROCEDURAL CARDIOVASCULAR EXAMINATION: ICD-10-CM

## 2019-01-03 DIAGNOSIS — I20.9 ANGINA PECTORIS (HCC): ICD-10-CM

## 2019-01-15 DIAGNOSIS — I10 ESSENTIAL HYPERTENSION: ICD-10-CM

## 2019-01-15 RX ORDER — IRBESARTAN AND HYDROCHLOROTHIAZIDE 150; 12.5 MG/1; MG/1
TABLET, FILM COATED ORAL
Qty: 30 TABLET | Refills: 2 | Status: SHIPPED | OUTPATIENT
Start: 2019-01-15 | End: 2019-04-15 | Stop reason: SDUPTHER

## 2019-02-05 ENCOUNTER — LAB (OUTPATIENT)
Dept: LAB | Facility: HOSPITAL | Age: 64
End: 2019-02-05

## 2019-02-05 DIAGNOSIS — I20.9 ANGINA PECTORIS (HCC): ICD-10-CM

## 2019-02-05 DIAGNOSIS — Z01.810 PREPROCEDURAL CARDIOVASCULAR EXAMINATION: ICD-10-CM

## 2019-02-05 DIAGNOSIS — Z13.6 SCREENING FOR CARDIOVASCULAR CONDITION: ICD-10-CM

## 2019-02-05 LAB
ANION GAP SERPL CALCULATED.3IONS-SCNC: 11.1 MMOL/L
BASOPHILS # BLD AUTO: 0.05 10*3/MM3 (ref 0–0.2)
BASOPHILS NFR BLD AUTO: 0.7 % (ref 0–1.5)
BUN BLD-MCNC: 8 MG/DL (ref 8–23)
BUN/CREAT SERPL: 11.1 (ref 7–25)
CALCIUM SPEC-SCNC: 9.8 MG/DL (ref 8.6–10.5)
CHLORIDE SERPL-SCNC: 100 MMOL/L (ref 98–107)
CO2 SERPL-SCNC: 29.9 MMOL/L (ref 22–29)
CREAT BLD-MCNC: 0.72 MG/DL (ref 0.57–1)
DEPRECATED RDW RBC AUTO: 44.8 FL (ref 37–54)
EOSINOPHIL # BLD AUTO: 0.54 10*3/MM3 (ref 0–0.7)
EOSINOPHIL NFR BLD AUTO: 7.5 % (ref 0.3–6.2)
ERYTHROCYTE [DISTWIDTH] IN BLOOD BY AUTOMATED COUNT: 13.6 % (ref 11.7–13)
GFR SERPL CREATININE-BSD FRML MDRD: 81 ML/MIN/1.73
GLUCOSE BLD-MCNC: 93 MG/DL (ref 65–99)
HCT VFR BLD AUTO: 42.2 % (ref 35.6–45.5)
HGB BLD-MCNC: 14.4 G/DL (ref 11.9–15.5)
IMM GRANULOCYTES # BLD AUTO: 0.01 10*3/MM3 (ref 0–0.03)
IMM GRANULOCYTES NFR BLD AUTO: 0.1 % (ref 0–0.5)
LYMPHOCYTES # BLD AUTO: 2.67 10*3/MM3 (ref 0.9–4.8)
LYMPHOCYTES NFR BLD AUTO: 37 % (ref 19.6–45.3)
MCH RBC QN AUTO: 30.8 PG (ref 26.9–32)
MCHC RBC AUTO-ENTMCNC: 34.1 G/DL (ref 32.4–36.3)
MCV RBC AUTO: 90.4 FL (ref 80.5–98.2)
MONOCYTES # BLD AUTO: 0.63 10*3/MM3 (ref 0.2–1.2)
MONOCYTES NFR BLD AUTO: 8.7 % (ref 5–12)
NEUTROPHILS # BLD AUTO: 3.32 10*3/MM3 (ref 1.9–8.1)
NEUTROPHILS NFR BLD AUTO: 46.1 % (ref 42.7–76)
PLATELET # BLD AUTO: 201 10*3/MM3 (ref 140–500)
PMV BLD AUTO: 10.9 FL (ref 6–12)
POTASSIUM BLD-SCNC: 4.1 MMOL/L (ref 3.5–5.2)
RBC # BLD AUTO: 4.67 10*6/MM3 (ref 3.9–5.2)
SODIUM BLD-SCNC: 141 MMOL/L (ref 136–145)
WBC NRBC COR # BLD: 7.21 10*3/MM3 (ref 4.5–10.7)

## 2019-02-05 PROCEDURE — 80048 BASIC METABOLIC PNL TOTAL CA: CPT

## 2019-02-05 PROCEDURE — 85025 COMPLETE CBC W/AUTO DIFF WBC: CPT

## 2019-02-05 PROCEDURE — 36415 COLL VENOUS BLD VENIPUNCTURE: CPT

## 2019-02-07 NOTE — PERIOPERATIVE NURSING NOTE
Patient called the pre op cath lab and Adrienne Brooks RN answered the phone since the pre op cath lab staff had gone for the day.  Patient explained that she did not have anyone to take her home and was requesting to take a cab home.  Adrienne asked for my assistance.  I spoke with the patient and explained the risks and the reason why a cab would not be acceptable.  The patient did disclose that she lived with her  92 year old mother and that her mother would be able to provide care at home post procedure. I encouraged the patient to call Dr. Asif's office to inform the physician while I investigated other methods to get the patient home.  I called the patient back and  discussed the options of proceeding without sedation or using a service such as [facility].  The patient has agreed to use [facility] for her discharge home and has given me permission to contact them on her behalf. The plan is to review discharge instructions with the patient while she in in pre op and before she receives sedation.  I contacted Tricia at [facility] to arrange the  for tomorrow.

## 2019-02-08 ENCOUNTER — HOSPITAL ENCOUNTER (OUTPATIENT)
Facility: HOSPITAL | Age: 64
Setting detail: HOSPITAL OUTPATIENT SURGERY
Discharge: HOME | End: 2019-02-08
Attending: INTERNAL MEDICINE | Admitting: INTERNAL MEDICINE

## 2019-02-08 VITALS
RESPIRATION RATE: 16 BRPM | SYSTOLIC BLOOD PRESSURE: 104 MMHG | DIASTOLIC BLOOD PRESSURE: 83 MMHG | HEIGHT: 63 IN | TEMPERATURE: 98.2 F | WEIGHT: 210 LBS | OXYGEN SATURATION: 97 % | BODY MASS INDEX: 37.21 KG/M2 | HEART RATE: 68 BPM

## 2019-02-08 DIAGNOSIS — I20.9 ANGINA PECTORIS (HCC): ICD-10-CM

## 2019-02-08 PROCEDURE — 25010000002 MIDAZOLAM PER 1 MG: Performed by: INTERNAL MEDICINE

## 2019-02-08 PROCEDURE — 93458 L HRT ARTERY/VENTRICLE ANGIO: CPT | Performed by: INTERNAL MEDICINE

## 2019-02-08 PROCEDURE — C1769 GUIDE WIRE: HCPCS | Performed by: INTERNAL MEDICINE

## 2019-02-08 PROCEDURE — 25010000002 HEPARIN (PORCINE) PER 1000 UNITS: Performed by: INTERNAL MEDICINE

## 2019-02-08 PROCEDURE — 0 IOPAMIDOL PER 1 ML: Performed by: INTERNAL MEDICINE

## 2019-02-08 PROCEDURE — C1894 INTRO/SHEATH, NON-LASER: HCPCS | Performed by: INTERNAL MEDICINE

## 2019-02-08 PROCEDURE — 25010000002 FENTANYL CITRATE (PF) 100 MCG/2ML SOLUTION: Performed by: INTERNAL MEDICINE

## 2019-02-08 PROCEDURE — 99152 MOD SED SAME PHYS/QHP 5/>YRS: CPT | Performed by: INTERNAL MEDICINE

## 2019-02-08 RX ORDER — SODIUM CHLORIDE 0.9 % (FLUSH) 0.9 %
3 SYRINGE (ML) INJECTION EVERY 12 HOURS SCHEDULED
Status: DISCONTINUED | OUTPATIENT
Start: 2019-02-08 | End: 2019-02-08 | Stop reason: HOSPADM

## 2019-02-08 RX ORDER — SODIUM CHLORIDE 0.9 % (FLUSH) 0.9 %
3-10 SYRINGE (ML) INJECTION AS NEEDED
Status: DISCONTINUED | OUTPATIENT
Start: 2019-02-08 | End: 2019-02-08 | Stop reason: HOSPADM

## 2019-02-08 RX ORDER — FENTANYL CITRATE 50 UG/ML
INJECTION, SOLUTION INTRAMUSCULAR; INTRAVENOUS AS NEEDED
Status: DISCONTINUED | OUTPATIENT
Start: 2019-02-08 | End: 2019-02-08 | Stop reason: HOSPADM

## 2019-02-08 RX ORDER — SODIUM CHLORIDE 0.9 % (FLUSH) 0.9 %
3 SYRINGE (ML) INJECTION EVERY 12 HOURS SCHEDULED
Status: CANCELLED | OUTPATIENT
Start: 2019-02-08

## 2019-02-08 RX ORDER — LIDOCAINE HYDROCHLORIDE 20 MG/ML
INJECTION, SOLUTION INFILTRATION; PERINEURAL AS NEEDED
Status: DISCONTINUED | OUTPATIENT
Start: 2019-02-08 | End: 2019-02-08 | Stop reason: HOSPADM

## 2019-02-08 RX ORDER — MIDAZOLAM HYDROCHLORIDE 1 MG/ML
INJECTION INTRAMUSCULAR; INTRAVENOUS AS NEEDED
Status: DISCONTINUED | OUTPATIENT
Start: 2019-02-08 | End: 2019-02-08 | Stop reason: HOSPADM

## 2019-02-08 RX ORDER — SODIUM CHLORIDE 9 MG/ML
75 INJECTION, SOLUTION INTRAVENOUS CONTINUOUS
Status: DISCONTINUED | OUTPATIENT
Start: 2019-02-08 | End: 2019-02-08 | Stop reason: HOSPADM

## 2019-02-08 RX ORDER — SODIUM CHLORIDE 0.9 % (FLUSH) 0.9 %
3-10 SYRINGE (ML) INJECTION AS NEEDED
Status: CANCELLED | OUTPATIENT
Start: 2019-02-08

## 2019-02-08 RX ORDER — LIDOCAINE HYDROCHLORIDE 10 MG/ML
0.1 INJECTION, SOLUTION EPIDURAL; INFILTRATION; INTRACAUDAL; PERINEURAL ONCE AS NEEDED
Status: DISCONTINUED | OUTPATIENT
Start: 2019-02-08 | End: 2019-02-08 | Stop reason: HOSPADM

## 2019-02-08 RX ADMIN — SODIUM CHLORIDE 75 ML/HR: 9 INJECTION, SOLUTION INTRAVENOUS at 07:40

## 2019-02-08 NOTE — DISCHARGE INSTRUCTIONS
[facility]  [address] [place] [address]    Coronary Angiogram (Radial/Ulnar Approach) After Care    Refer to this sheet in the next few weeks. These instructions provide you with information on caring for yourself after your procedure. Your caregiver may also give you more specific instructions. Your treatment has been planned according to current medical practices, but problems sometimes occur. Call your caregiver if you have any problems or questions after your procedure.    Home Care Instructions:  · You may shower the day after the procedure. Remove the bandage (dressing) and gently wash the site with plain soap and water. Gently pat the site dry. You may apply a band aid daily for 2 days if desired.    · Do not apply powder or lotion to the site.  · Do not submerge the affected site in water for 3 to 5 days or until the site is completely healed.   · Do not lift, push or pull anything over 10 pounds for 2 days after your procedure.  · Inspect the site at least twice daily. You may notice some bruising at the site and it may be tender for 1 to 2 weeks.     · Increase your fluid intake for the next 2 days.    · Keep arm elevated for 24 hours. For the remainder of the day, keep your arm in “Pledge of Allegiance” position when up and about.     · You may drive 24 hours after the procedure unless otherwise instructed by your caregiver.  · Do not operate machinery or power tools for 24 hours.  · A responsible adult should be with you for the first 24 hours after you arrive home. Do not make any important legal decisions or sign legal papers for 24 hours.  Do not drink alcohol for 24 hours.    · Metformin or any medications containing Metformin should not be taken for 48 hours after your procedure.      Call Your Doctor if:   · You have unusual pain at the radial/ulnar (wrist) site.  · You have redness, warmth, swelling, or pain at the radial/ulnar (wrist) site.  · You have drainage (other  than a small amount of blood on the dressing).  · You have chills or a fever > 101.  · Your arm becomes pale or dark, cool, tingly, or numb.  · You have heavy bleeding from the site, hold pressure on the site for 20 minutes.  If the bleeding stops, apply a fresh bandage and call your cardiologist.  However, if you continue to have bleeding, call 911.

## 2019-02-08 NOTE — H&P
Chief Complaint   Patient presents with   • Chest Pain      History of Present Illness  I appreciate the opportunity to evaluate this patient in consultation for chest pain.     The patient noted the onset of substernal chest discomfort in August 2017.  She describes a heaviness across her precordium that radiates up into the neck.  The episodes are precipitated by exertion or stressful situations.  They last for minutes up to one hour.  They are always relieved by rest.  She has had some nocturnal episodes that have awakened her from sleep.  The intensity of the discomfort has not changed but the frequency has increased over the past year.  There is some radiation of the symptoms to her left and right arms.  There is no radiation up into the jaw.  There is no associated diaphoresis or palpitations.     The patient's risk factors for coronary disease include hypertension, hyperlipidemia and a family history.  The patient also has a remote smoking history but none since 1974.  She also has been moderately obese most of her adult life.        Medical History        Past Medical History:   Diagnosis Date   • Chest pressure     • Depression     • Essential hypertension     • Glaucoma     • Hyperlipidemia     • Hypothyroidism     • Interstitial cystitis                 Surgical History         Past Surgical History:   Procedure Laterality Date   • CHOLECYSTECTOMY       • ENDOMETRIAL ABLATION       • EYE ENUCLEATION Left age 11   • TONSILLECTOMY                      Current Outpatient Medications:   •  irbesartan-hydrochlorothiazide (AVALIDE) 150-12.5 MG tablet, TAKE ONE TABLET BY MOUTH DAILY, Disp: 30 tablet, Rfl: 0  •  timolol (TIMOPTIC) 0.5 % ophthalmic solution, 1 drop 2 (Two) Times a Day., Disp: , Rfl:         Social History               Socioeconomic History   • Marital status: Single       Spouse name: Not on file   • Number of children: Not on file   • Years of education: Not on file   • Highest education level:  "Not on file   Social Needs   • Financial resource strain: Not on file   • Food insecurity - worry: Not on file   • Food insecurity - inability: Not on file   • Transportation needs - medical: Not on file   • Transportation needs - non-medical: Not on file   Occupational History   • Not on file   Tobacco Use   • Smoking status: Former Smoker   • Smokeless tobacco: Never Used   Substance and Sexual Activity   • Alcohol use: Not on file   • Drug use: No   • Sexual activity: Defer   Other Topics Concern   • Not on file   Social History Narrative   • Not on file               Review of Systems   Constitution: Positive for malaise/fatigue.   HENT: Negative.    Eyes: Negative.    Cardiovascular: Positive for chest pain, dyspnea on exertion and palpitations.   Respiratory: Negative.    Endocrine: Negative.    Skin: Negative.    Musculoskeletal: Negative.    Gastrointestinal: Negative.    Neurological: Negative.    Psychiatric/Behavioral: Negative.          Procedures        ECG 12 Lead  Date/Time: 12/20/2018 3:39 PM  Performed by: Mateo Asif MD  Authorized by: Mateo Asif MD   Comparison: compared with previous ECG from 11/28/2018  Similar to previous ECG  Rhythm: sinus rhythm  Rate: normal  Conduction: conduction normal  QRS axis: normal                        Objective:    /88   Pulse 66   Ht 161.3 cm (63.5\")   Wt 101 kg (222 lb)   BMI 38.71 kg/m²            Physical Exam   Constitutional: She is oriented to person, place, and time. She appears well-developed and well-nourished.   HENT:   Head: Normocephalic.   Eyes: Pupils are equal, round, and reactive to light.   Neck: Normal range of motion. No JVD present. Carotid bruit is not present. No thyromegaly present.   Cardiovascular: Normal rate, regular rhythm, S1 normal, S2 normal, normal heart sounds and intact distal pulses. Exam reveals no gallop and no friction rub.   No murmur heard.  Pulmonary/Chest: Effort normal and breath sounds " normal.   Abdominal: Soft. Bowel sounds are normal.   Musculoskeletal: She exhibits no edema.   Neurological: She is alert and oriented to person, place, and time.   Skin: Skin is warm, dry and intact. No erythema.   Psychiatric: She has a normal mood and affect.   Vitals reviewed.            Assessment:        Diagnosis Plan   1. Precordial pain  Treadmill Stress Test   2. Essential hypertension  Treadmill Stress Test   3. Obesity (BMI 35.0-39.9 without comorbidity)         1.  Chest pain: The patient's symptoms appeared to be that of angina pectoris.  Her electrocardiogram and exam however remain normal.  We will plan on a stress test.     2.  Hypertension: Controlled on medical therapy     3.  Obesity: Diet and exercise measures discussed     H&P updated. The patient was examined and abnormal stress

## 2019-04-15 DIAGNOSIS — I10 ESSENTIAL HYPERTENSION: ICD-10-CM

## 2019-04-15 RX ORDER — IRBESARTAN AND HYDROCHLOROTHIAZIDE 150; 12.5 MG/1; MG/1
TABLET, FILM COATED ORAL
Qty: 30 TABLET | Refills: 1 | Status: SHIPPED | OUTPATIENT
Start: 2019-04-15 | End: 2019-06-18 | Stop reason: SDUPTHER

## 2019-05-15 ENCOUNTER — OFFICE VISIT (OUTPATIENT)
Dept: FAMILY MEDICINE CLINIC | Facility: CLINIC | Age: 64
End: 2019-05-15

## 2019-05-15 VITALS
BODY MASS INDEX: 38.98 KG/M2 | WEIGHT: 220 LBS | OXYGEN SATURATION: 97 % | SYSTOLIC BLOOD PRESSURE: 150 MMHG | HEIGHT: 63 IN | HEART RATE: 67 BPM | DIASTOLIC BLOOD PRESSURE: 90 MMHG

## 2019-05-15 DIAGNOSIS — Z00.00 MEDICARE ANNUAL WELLNESS VISIT, SUBSEQUENT: Primary | ICD-10-CM

## 2019-05-15 PROCEDURE — G0439 PPPS, SUBSEQ VISIT: HCPCS | Performed by: NURSE PRACTITIONER

## 2019-05-15 NOTE — PROGRESS NOTES
QUICK REFERENCE INFORMATION:She states that Avalide causes dizziness.   The ABCs of the Annual Wellness Visit    Subsequent Medicare Wellness Visit     HEALTH RISK ASSESSMENT    : 1955    Recent Hospitalizations:  No hospitalization(s) within the last year..  ccc    Repeat /86  Current Medical Providers:  Patient Care Team:  Kindra Biggs APRN as PCP - General (Family Medicine)    Has some dizziness at times but it does not last long.    Smoking Status:  Social History     Tobacco Use   Smoking Status Former Smoker   • Last attempt to quit:    • Years since quittin.3   Smokeless Tobacco Never Used       Alcohol Consumption:  Social History     Substance and Sexual Activity   Alcohol Use No   • Frequency: Never       Depression Screen:   PHQ-2/PHQ-9 Depression Screening 3/7/2017   Little interest or pleasure in doing things 0   Feeling down, depressed, or hopeless 1   Trouble falling or staying asleep, or sleeping too much 1   Feeling tired or having little energy 1   Feeling bad about yourself - or that you are a failure or have let yourself or your family down 1   Trouble concentrating on things, such as reading the newspaper or watching television 0   Moving or speaking so slowly that other people could have noticed. Or the opposite - being so fidgety or restless that you have been moving around a lot more than usual 0   Thoughts that you would be better off dead, or of hurting yourself in some way 0   Total Score 4   If you checked off any problems, how difficult have these problems made it for you to do your work, take care of things at home, or get along with other people? Not difficult at all       Health Habits and Functional and Cognitive Screening:  No flowsheet data found.        Does the patient have evidence of cognitive impairment? No    Asiprin use counseling: Contraindicated from taking ASA      Recent Lab Results:    Lab Results   Component Value Date     (H)  03/07/2017     Lab Results   Component Value Date    HGBA1C 5.40 03/07/2017     Lab Results   Component Value Date    TRIG 135 03/07/2017    HDL 73 (H) 03/07/2017    VLDL 27 03/07/2017           Age-appropriate Screening Schedule:  Refer to the list below for future screening recommendations based on patient's age, sex and/or medical conditions. Orders for these recommended tests are listed in the plan section. The patient has been provided with a written plan.    Health Maintenance   Topic Date Due   • LIPID PANEL  03/07/2018   • PNEUMOCOCCAL VACCINES (65+ LOW/MEDIUM RISK) (1 of 2 - PCV13) 11/29/2019 (Originally 2/4/2018)   • ZOSTER VACCINE (2 of 2) 11/29/2019 (Originally 2/26/2014)   • INFLUENZA VACCINE  08/01/2019   • COLONOSCOPY  01/01/2020   • TDAP/TD VACCINES (2 - Td) 01/01/2020   • MAMMOGRAM  09/24/2020        Subjective   History of Present Illness    Amairani Little is a 64 y.o. female who presents for an Annual Wellness Visit.    The following portions of the patient's history were reviewed and updated as appropriate: allergies, current medications, past family history, past medical history, past social history, past surgical history and problem list.    Outpatient Medications Prior to Visit   Medication Sig Dispense Refill   • irbesartan-hydrochlorothiazide (AVALIDE) 150-12.5 MG tablet TAKE ONE TABLET BY MOUTH DAILY 30 tablet 1   • timolol (TIMOPTIC) 0.5 % ophthalmic solution Administer 1 drop to the right eye 2 (Two) Times a Day.       No facility-administered medications prior to visit.        Patient Active Problem List   Diagnosis   • Other specified hypothyroidism   • Hyperlipidemia   • Glaucoma   • Depression   • Essential hypertension   • Angina pectoris (CMS/[facility])       Advance Care Planning:  Patient has an advance directive - a copy has not been provided. Have asked the patient to send this to us to add to record    Identification of Risk Factors:  Risk factors include: weight .    Review of  "Systems    Compared to one year ago, the patient feels her physical health is better.  Compared to one year ago, the patient feels her mental health is better.    Objective     Physical Exam   Constitutional: She appears well-developed and well-nourished. No distress.   HENT:   Head: Normocephalic and atraumatic.   Right Ear: External ear normal.   Left Ear: External ear normal.   Eyes: EOM are normal.   Neck: Neck supple. No thyromegaly present.   Cardiovascular: Normal rate, regular rhythm and normal heart sounds.   Pulmonary/Chest: Effort normal and breath sounds normal.   Musculoskeletal: Normal range of motion.   Neurological: She is alert.   Skin: Skin is warm.   Nursing note and vitals reviewed.      Vitals:    05/15/19 1310   Weight: 99.8 kg (220 lb)   Height: 160 cm (63\")       Patient's Body mass index is 38.97 kg/m². BMI is above normal parameters. Recommendations include: exercise.      Assessment/Plan   Patient Self-Management and Personalized Health Advice  The patient has been provided with information about: prevention of cardiac or vascular disease and preventive services including:   · Advance directive.    Visit Diagnoses:  No diagnosis found.    No orders of the defined types were placed in this encounter.      Outpatient Encounter Medications as of 5/15/2019   Medication Sig Dispense Refill   • irbesartan-hydrochlorothiazide (AVALIDE) 150-12.5 MG tablet TAKE ONE TABLET BY MOUTH DAILY 30 tablet 1   • timolol (TIMOPTIC) 0.5 % ophthalmic solution Administer 1 drop to the right eye 2 (Two) Times a Day.       No facility-administered encounter medications on file as of 5/15/2019.        Reviewed use of high risk medication in the elderly: not applicable  Reviewed for potential of harmful drug interactions in the elderly: not applicable    Follow Up:  No Follow-up on file.     An After Visit Summary and PPPS with all of these plans were given to the patient.           Is going to return for her lipid " panel when she has been fasting    Answers for HPI/ROS submitted by the patient on 5/13/2019   Hypertension  Chronicity: chronic  Onset: more than 1 year ago  Progression since onset: unchanged  Condition status: controlled  anxiety: No  blurred vision: No  malaise/fatigue: Yes  orthopnea: No  peripheral edema: No  PND: No  sweats: No  Agents associated with hypertension: no associated agents  CAD risks: obesity  Compliance problems: exercise

## 2019-05-15 NOTE — PATIENT INSTRUCTIONS
Preventive Care 65 Years and Older, Female  Preventive care refers to lifestyle choices and visits with your health care provider that can promote health and wellness.  What does preventive care include?  · A yearly physical exam. This is also called an annual well check.  · Dental exams once or twice a year.  · Routine eye exams. Ask your health care provider how often you should have your eyes checked.  · Personal lifestyle choices, including:  ? Daily care of your teeth and gums.  ? Regular physical activity.  ? Eating a healthy diet.  ? Avoiding tobacco and drug use.  ? Limiting alcohol use.  ? Practicing safe sex.  ? Taking low-dose aspirin every day.  ? Taking vitamin and mineral supplements as recommended by your health care provider.  What happens during an annual well check?  The services and screenings done by your health care provider during your annual well check will depend on your age, overall health, lifestyle risk factors, and family history of disease.  Counseling  Your health care provider may ask you questions about your:  · Alcohol use.  · Tobacco use.  · Drug use.  · Emotional well-being.  · Home and relationship well-being.  · Sexual activity.  · Eating habits.  · History of falls.  · Memory and ability to understand (cognition).  · Work and work environment.  · Reproductive health.    Screening  You may have the following tests or measurements:  · Height, weight, and BMI.  · Blood pressure.  · Lipid and cholesterol levels. These may be checked every 5 years, or more frequently if you are over 50 years old.  · Skin check.  · Lung cancer screening. You may have this screening every year starting at age 55 if you have a 30-pack-year history of smoking and currently smoke or have quit within the past 15 years.  · Fecal occult blood test (FOBT) of the stool. You may have this test every year starting at age 50.  · Flexible sigmoidoscopy or colonoscopy. You may have a sigmoidoscopy every 5 years or  a colonoscopy every 10 years starting at age 50.  · Hepatitis C blood test.  · Hepatitis B blood test.  · Sexually transmitted disease (STD) testing.  · Diabetes screening. This is done by checking your blood sugar (glucose) after you have not eaten for a while (fasting). You may have this done every 1-3 years.  · Bone density scan. This is done to screen for osteoporosis. You may have this done starting at age 65.  · Mammogram. This may be done every 1-2 years. Talk to your health care provider about how often you should have regular mammograms.    Talk with your health care provider about your test results, treatment options, and if necessary, the need for more tests.  Vaccines  Your health care provider may recommend certain vaccines, such as:  · Influenza vaccine. This is recommended every year.  · Tetanus, diphtheria, and acellular pertussis (Tdap, Td) vaccine. You may need a Td booster every 10 years.  · Varicella vaccine. You may need this if you have not been vaccinated.  · Zoster vaccine. You may need this after age 60.  · Measles, mumps, and rubella (MMR) vaccine. You may need at least one dose of MMR if you were born in 1957 or later. You may also need a second dose.  · Pneumococcal 13-valent conjugate (PCV13) vaccine. One dose is recommended after age 65.  · Pneumococcal polysaccharide (PPSV23) vaccine. One dose is recommended after age 65.  · Meningococcal vaccine. You may need this if you have certain conditions.  · Hepatitis A vaccine. You may need this if you have certain conditions or if you travel or work in places where you may be exposed to hepatitis A.  · Hepatitis B vaccine. You may need this if you have certain conditions or if you travel or work in places where you may be exposed to hepatitis B.  · Haemophilus influenzae type b (Hib) vaccine. You may need this if you have certain conditions.    Talk to your health care provider about which screenings and vaccines you need and how often you  need them.  This information is not intended to replace advice given to you by your health care provider. Make sure you discuss any questions you have with your health care provider.  Document Released: 01/13/2017 Document Revised: 07/09/2018 Document Reviewed: 10/18/2016  [employer]. Interactive Patient Education © 2019 [employer]

## 2019-06-18 DIAGNOSIS — I10 ESSENTIAL HYPERTENSION: ICD-10-CM

## 2019-06-18 RX ORDER — IRBESARTAN AND HYDROCHLOROTHIAZIDE 150; 12.5 MG/1; MG/1
TABLET, FILM COATED ORAL
Qty: 30 TABLET | Refills: 2 | Status: SHIPPED | OUTPATIENT
Start: 2019-06-18

## (undated) DEVICE — GLIDESHEATH BASIC HYDROPHILIC COATED INTRODUCER SHEATH: Brand: GLIDESHEATH

## (undated) DEVICE — GW EMR FIX EXCHG J STD .035 3MM 260CM

## (undated) DEVICE — CATH DIAG IMPULSE PIG 5F 100CM

## (undated) DEVICE — CATH DIAG IMPULSE FR4 5F 100CM

## (undated) DEVICE — CATH DIAG IMPULSE FL3.5 5F 100CM

## (undated) DEVICE — PK CATH CARD 40

## (undated) DEVICE — KT MANIFLD CARDIAC